# Patient Record
Sex: FEMALE | URBAN - METROPOLITAN AREA
[De-identification: names, ages, dates, MRNs, and addresses within clinical notes are randomized per-mention and may not be internally consistent; named-entity substitution may affect disease eponyms.]

---

## 2024-11-12 ENCOUNTER — LAB OUTSIDE AN ENCOUNTER (OUTPATIENT)
Dept: URBAN - METROPOLITAN AREA CLINIC 107 | Facility: CLINIC | Age: 59
End: 2024-11-12

## 2024-11-12 ENCOUNTER — DASHBOARD ENCOUNTERS (OUTPATIENT)
Age: 59
End: 2024-11-12

## 2024-11-12 ENCOUNTER — OFFICE VISIT (OUTPATIENT)
Dept: URBAN - METROPOLITAN AREA CLINIC 107 | Facility: CLINIC | Age: 59
End: 2024-11-12
Payer: COMMERCIAL

## 2024-11-12 VITALS
WEIGHT: 141.6 LBS | DIASTOLIC BLOOD PRESSURE: 96 MMHG | TEMPERATURE: 97.9 F | HEIGHT: 62 IN | SYSTOLIC BLOOD PRESSURE: 134 MMHG | BODY MASS INDEX: 26.06 KG/M2 | HEART RATE: 69 BPM

## 2024-11-12 DIAGNOSIS — K90.0 CELIAC DISEASE: ICD-10-CM

## 2024-11-12 DIAGNOSIS — Z12.11 COLON CANCER SCREENING: ICD-10-CM

## 2024-11-12 DIAGNOSIS — K21.9 CHRONIC GERD: ICD-10-CM

## 2024-11-12 PROBLEM — 235595009: Status: ACTIVE | Noted: 2024-11-12

## 2024-11-12 PROBLEM — 396331005: Status: ACTIVE | Noted: 2024-11-12

## 2024-11-12 PROCEDURE — 99203 OFFICE O/P NEW LOW 30 MIN: CPT | Performed by: INTERNAL MEDICINE

## 2024-11-12 RX ORDER — ESTRADIOL 0.05 MG/D
1 PATCH TO SKIN PATCH TRANSDERMAL
Status: ACTIVE | COMMUNITY

## 2024-11-12 RX ORDER — MELOXICAM 15 MG/1
1 TABLET TABLET ORAL ONCE A DAY
Status: ACTIVE | COMMUNITY

## 2024-11-12 RX ORDER — ESTRADIOL 0.1 MG/G
AS DIRECTED CREAM VAGINAL
Status: ACTIVE | COMMUNITY

## 2024-11-12 RX ORDER — TRIAMTERENE AND HYDROCHLOROTHIAZIDE 37.5; 25 MG/1; MG/1
1 CAPSULE IN THE MORNING CAPSULE ORAL ONCE A DAY
Status: ACTIVE | COMMUNITY

## 2024-11-12 RX ORDER — LEVOTHYROXINE SODIUM 88 UG/1
1 TABLET IN THE MORNING ON AN EMPTY STOMACH TABLET ORAL ONCE A DAY
Status: ACTIVE | COMMUNITY

## 2024-11-12 NOTE — HPI-TODAY'S VISIT:
Hannah Pena is a 58-year-old female referred by Dr. Yandy Joshi because of abdominal bloating complaints and abnormal celiac testing.  At the time of an 8/28/2024 office visit with Dr. Joshi, she complained of a 1 month history of intermittent abdominal bloating as well as some itching and fatigue.  She denied any nausea or vomiting and had no fever.  Bloating was worse after eating and she describes early satiety as well.  The patient had an abdominal sonogram done in the office that showed no gross pathology.  Celiac testing was abnormal, with a positive HLA DQ 2 HLA typing.  This is a variant associated with celiac disease.  Antiendomysial and anti-antigliadin antibodies were not performed.  The patient has not had an upper endoscopy in the past.  Her complete metabolic panel was normal, as was a CBC.  Specifically, the CBC, done on August 28, 2024, showed a white blood cell count of 6600, hemoglobin 13.7, adequate 41.0%, and a platelet count of 314,000.  Her hepatitis serologies were negative.  TSH was 1.47.  Vitamin B12 level was normal at 795, and her albumin was 4.8.  Alkaline phosphatase is 53, AST 16, ALT 70, and total bilirubin was 0.3.   The patient has been gluten-free for 2 months, and feels much better overall.  She has mild constipation, but her bloating has resolved.  She has actually lost 6 pounds since coming gluten-free.  The patient has had some episodic bouts of nausea and vomiting associated with heartburn complaints.  She has never had an upper endoscopy.    She last had a colonoscopy done at age 49 by Dr. Cody Elizabeth, but would like to go ahead and repeat her screening colonoscopy at this time.

## 2025-01-03 ENCOUNTER — CLAIMS CREATED FROM THE CLAIM WINDOW (OUTPATIENT)
Dept: URBAN - METROPOLITAN AREA SURGERY CENTER 25 | Facility: SURGERY CENTER | Age: 60
End: 2025-01-03
Payer: COMMERCIAL

## 2025-01-03 ENCOUNTER — CLAIMS CREATED FROM THE CLAIM WINDOW (OUTPATIENT)
Dept: URBAN - METROPOLITAN AREA CLINIC 4 | Facility: CLINIC | Age: 60
End: 2025-01-03
Payer: COMMERCIAL

## 2025-01-03 DIAGNOSIS — K21.9 GASTRO-ESOPHAGEAL REFLUX DISEASE WITHOUT ESOPHAGITIS: ICD-10-CM

## 2025-01-03 DIAGNOSIS — K31.89 OTHER DISEASES OF STOMACH AND DUODENUM: ICD-10-CM

## 2025-01-03 DIAGNOSIS — K21.9 CHRONIC GERD: ICD-10-CM

## 2025-01-03 DIAGNOSIS — R76.8 OTHER SPECIFIED ABNORMAL IMMUNOLOGICAL FINDINGS IN SERUM: ICD-10-CM

## 2025-01-03 DIAGNOSIS — R12 HEARTBURN: ICD-10-CM

## 2025-01-03 PROCEDURE — 43239 EGD BIOPSY SINGLE/MULTIPLE: CPT | Performed by: INTERNAL MEDICINE

## 2025-01-03 PROCEDURE — 88305 TISSUE EXAM BY PATHOLOGIST: CPT | Performed by: PATHOLOGY

## 2025-01-03 PROCEDURE — 00731 ANES UPR GI NDSC PX NOS: CPT | Performed by: ANESTHESIOLOGY

## 2025-01-03 PROCEDURE — 00731 ANES UPR GI NDSC PX NOS: CPT | Performed by: NURSE ANESTHETIST, CERTIFIED REGISTERED

## 2025-01-03 RX ORDER — ESTRADIOL 0.05 MG/D
1 PATCH TO SKIN PATCH TRANSDERMAL
Status: ACTIVE | COMMUNITY

## 2025-01-03 RX ORDER — ESTRADIOL 0.1 MG/G
AS DIRECTED CREAM VAGINAL
Status: ACTIVE | COMMUNITY

## 2025-01-03 RX ORDER — MELOXICAM 15 MG/1
1 TABLET TABLET ORAL ONCE A DAY
Status: ACTIVE | COMMUNITY

## 2025-01-03 RX ORDER — TRIAMTERENE AND HYDROCHLOROTHIAZIDE 37.5; 25 MG/1; MG/1
1 CAPSULE IN THE MORNING CAPSULE ORAL ONCE A DAY
Status: ACTIVE | COMMUNITY

## 2025-01-03 RX ORDER — LEVOTHYROXINE SODIUM 88 UG/1
1 TABLET IN THE MORNING ON AN EMPTY STOMACH TABLET ORAL ONCE A DAY
Status: ACTIVE | COMMUNITY

## 2025-02-07 ENCOUNTER — CLAIMS CREATED FROM THE CLAIM WINDOW (OUTPATIENT)
Dept: URBAN - METROPOLITAN AREA SURGERY CENTER 25 | Facility: SURGERY CENTER | Age: 60
End: 2025-02-07
Payer: COMMERCIAL

## 2025-02-07 DIAGNOSIS — Z12.11 ENCOUNTER FOR SCREENING FOR MALIGNANT NEOPLASM OF COLON: ICD-10-CM

## 2025-02-07 DIAGNOSIS — Z12.11 COLON CANCER SCREENING (HIGH RISK): ICD-10-CM

## 2025-02-07 DIAGNOSIS — K64.0 FIRST DEGREE HEMORRHOIDS: ICD-10-CM

## 2025-02-07 PROCEDURE — 00812 ANES LWR INTST SCR COLSC: CPT | Performed by: NURSE ANESTHETIST, CERTIFIED REGISTERED

## 2025-02-07 PROCEDURE — G0121 COLON CA SCRN NOT HI RSK IND: HCPCS | Performed by: INTERNAL MEDICINE

## 2025-02-07 PROCEDURE — 0528F RCMND FLW-UP 10 YRS DOCD: CPT | Performed by: INTERNAL MEDICINE

## 2025-02-07 RX ORDER — TRIAMTERENE AND HYDROCHLOROTHIAZIDE 37.5; 25 MG/1; MG/1
1 CAPSULE IN THE MORNING CAPSULE ORAL ONCE A DAY
Status: ACTIVE | COMMUNITY

## 2025-02-07 RX ORDER — ESTRADIOL 0.1 MG/G
AS DIRECTED CREAM VAGINAL
Status: ACTIVE | COMMUNITY

## 2025-02-07 RX ORDER — LEVOTHYROXINE SODIUM 88 UG/1
1 TABLET IN THE MORNING ON AN EMPTY STOMACH TABLET ORAL ONCE A DAY
Status: ACTIVE | COMMUNITY

## 2025-02-07 RX ORDER — ESTRADIOL 0.05 MG/D
1 PATCH TO SKIN PATCH TRANSDERMAL
Status: ACTIVE | COMMUNITY

## 2025-02-07 RX ORDER — MELOXICAM 15 MG/1
1 TABLET TABLET ORAL ONCE A DAY
Status: ACTIVE | COMMUNITY

## 2025-02-24 ENCOUNTER — OFFICE VISIT (OUTPATIENT)
Dept: URBAN - METROPOLITAN AREA CLINIC 113 | Facility: CLINIC | Age: 60
End: 2025-02-24
Payer: COMMERCIAL

## 2025-02-24 VITALS
HEIGHT: 62 IN | HEART RATE: 70 BPM | BODY MASS INDEX: 26.28 KG/M2 | RESPIRATION RATE: 18 BRPM | DIASTOLIC BLOOD PRESSURE: 80 MMHG | TEMPERATURE: 97.7 F | SYSTOLIC BLOOD PRESSURE: 117 MMHG | WEIGHT: 142.8 LBS

## 2025-02-24 DIAGNOSIS — Z12.11 COLON CANCER SCREENING: ICD-10-CM

## 2025-02-24 DIAGNOSIS — K21.9 CHRONIC GERD: ICD-10-CM

## 2025-02-24 DIAGNOSIS — K90.0 CELIAC DISEASE: ICD-10-CM

## 2025-02-24 PROCEDURE — 99213 OFFICE O/P EST LOW 20 MIN: CPT | Performed by: INTERNAL MEDICINE

## 2025-02-24 RX ORDER — ESTRADIOL 0.1 MG/G
AS DIRECTED CREAM VAGINAL
Status: ACTIVE | COMMUNITY

## 2025-02-24 RX ORDER — MELOXICAM 15 MG/1
1 TABLET TABLET ORAL ONCE A DAY
Status: ACTIVE | COMMUNITY

## 2025-02-24 RX ORDER — LEVOTHYROXINE SODIUM 88 UG/1
1 TABLET IN THE MORNING ON AN EMPTY STOMACH TABLET ORAL ONCE A DAY
Status: ACTIVE | COMMUNITY

## 2025-02-24 RX ORDER — TRIAMTERENE AND HYDROCHLOROTHIAZIDE 37.5; 25 MG/1; MG/1
1 CAPSULE IN THE MORNING CAPSULE ORAL ONCE A DAY
Status: ACTIVE | COMMUNITY

## 2025-02-24 RX ORDER — ESTRADIOL 0.05 MG/D
1 PATCH TO SKIN PATCH TRANSDERMAL
Status: ACTIVE | COMMUNITY

## 2025-02-24 NOTE — HPI-TODAY'S VISIT:
Hannah Pena is a 59-year-old female referred by Dr. Yandy Joshi because of abdominal bloating complaints and abnormal celiac testing.  At the time of an 8/28/2024 office visit with Dr. Joshi, she complained of a 1 month history of intermittent abdominal bloating as well as some itching and fatigue.  She denied any nausea or vomiting and had no fever.  Bloating was worse after eating and she describes early satiety as well.  The patient had an abdominal sonogram done in the office that showed no gross pathology.  Celiac testing was abnormal, with a positive HLA DQ 2 HLA typing.  This is a variant associated with celiac disease.  Antiendomysial and anti-antigliadin antibodies were not performed.  The patient has not had an upper endoscopy in the past.  Her complete metabolic panel was normal, as was a CBC.  Specifically, the CBC, done on August 28, 2024, showed a white blood cell count of 6600, hemoglobin 13.7, adequate 41.0%, and a platelet count of 314,000.  Her hepatitis serologies were negative.  TSH was 1.47.  Vitamin B12 level was normal at 795, and her albumin was 4.8.  Alkaline phosphatase is 53, AST 16, ALT 70, and total bilirubin was 0.3.  At the time of her last OV, the patient had been gluten-free for 2 months, and felt much better overall.  She had mild constipation, but her bloating had resolved.  She had actually lost 6 pounds since becoming gluten-free.  The patient did note some episodic bouts of nausea and vomiting associated with heartburn complaints.  She had never had an upper endoscopy.    She last had a prior colonoscopy done at age 49 by Dr. Cody Elizabeth.    After her last office visit, the patient underwent upper GI endoscopy on January 3, 2025 which was grossly normal, with small bowel biopsies showing no evidence of active celiac disease or villous atrophy.  Colonoscopy done on February 7, 2025 showed internal hemorrhoids, grade 1, no other abnormalities.  Repeat colonoscopy is recommended for 10 years.The patient is doing well today.  She has no specific GI complaints.

## 2025-04-16 ENCOUNTER — LAB OUTSIDE AN ENCOUNTER (OUTPATIENT)
Dept: URBAN - METROPOLITAN AREA CLINIC 113 | Facility: CLINIC | Age: 60
End: 2025-04-16

## 2025-04-16 ENCOUNTER — WEB ENCOUNTER (OUTPATIENT)
Dept: URBAN - METROPOLITAN AREA CLINIC 107 | Facility: CLINIC | Age: 60
End: 2025-04-16